# Patient Record
Sex: FEMALE | Race: WHITE | NOT HISPANIC OR LATINO | URBAN - METROPOLITAN AREA
[De-identification: names, ages, dates, MRNs, and addresses within clinical notes are randomized per-mention and may not be internally consistent; named-entity substitution may affect disease eponyms.]

---

## 2017-11-22 ENCOUNTER — IMPORTED ENCOUNTER (OUTPATIENT)
Dept: URBAN - METROPOLITAN AREA CLINIC 50 | Facility: CLINIC | Age: 82
End: 2017-11-22

## 2017-11-28 ENCOUNTER — IMPORTED ENCOUNTER (OUTPATIENT)
Dept: URBAN - METROPOLITAN AREA CLINIC 50 | Facility: CLINIC | Age: 82
End: 2017-11-28

## 2020-02-04 ENCOUNTER — IMPORTED ENCOUNTER (OUTPATIENT)
Dept: URBAN - METROPOLITAN AREA CLINIC 50 | Facility: CLINIC | Age: 85
End: 2020-02-04

## 2020-12-01 ENCOUNTER — IMPORTED ENCOUNTER (OUTPATIENT)
Dept: URBAN - METROPOLITAN AREA CLINIC 50 | Facility: CLINIC | Age: 85
End: 2020-12-01

## 2020-12-01 NOTE — PATIENT DISCUSSION
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 Mild diabetic retinopathy found. Macular edema is not present in the left eye. Patient instructed to call office immediately if sudden changes in vision occur. Emphasized importance of good blood glucose control. Return in 1 year for dilated fundus exam. Summary of care provided to the physician managing the ongoing diabetes care. """

## 2020-12-18 ENCOUNTER — IMPORTED ENCOUNTER (OUTPATIENT)
Dept: URBAN - METROPOLITAN AREA CLINIC 50 | Facility: CLINIC | Age: 85
End: 2020-12-18

## 2021-04-18 ASSESSMENT — VISUAL ACUITY
OS_CC: 20/50
OD_CC: 20/40-2
OD_CC: 20/50
OD_CC: J1
OS_CC: 20/50+1
OS_CC: J1@ 14 IN
OS_CC: J1@ 16 IN
OD_CC: 20/50
OD_OTHER: 20/80. 20/200.
OD_BAT: 20/80
OD_CC: J1@ 14 IN
OS_CC: 20/30
OS_CC: J1
OD_PH: 20/40-2
OD_CC: J1@ 16 IN
OS_OTHER: 20/80. 20/200.
OS_BAT: 20/80

## 2021-04-18 ASSESSMENT — TONOMETRY
OS_IOP_MMHG: 14
OD_IOP_MMHG: 15
OS_IOP_MMHG: 15
OD_IOP_MMHG: 14
OD_IOP_MMHG: 15
OS_IOP_MMHG: 15

## 2023-09-20 NOTE — PATIENT DISCUSSION
Retinal detachment warnings given. Ketoconazole Pregnancy And Lactation Text: This medication is Pregnancy Category C and it isn't know if it is safe during pregnancy. It is also excreted in breast milk and breast feeding isn't recommended.

## 2024-05-10 NOTE — PATIENT DISCUSSION
RIGHT KNEE ARTHROSCOPY WITH MPFL RECONSTRUCTION, TIBIAL TUBERCLE OSTEOTOMY AND CARTILAGE REPAIR    [phone]       Surgery date 6/5/24  Location:  [facility] ... Standard Medicaid Plan or [place] Medicaid?  NO - OK to proceed as scheduled at planned facility.    Laterality confirmed: Right   BMI - done   PCP(need to see within 30 days of surgery- usually between 2-3 weeks) - done - quinones   Cardiology- No  Insurance - Medicaid   Work Related-No     ASA(blood thinner) or NSAIDS(anti inflamatory) 14 days prior- informed   Weight loss meds - No   NPO after midnight - done   Has ride after discharge- done      Prehab- No  Rehab -No  DME - Yes    Pre Op - No  Post Op -   6/19/2024 2:15 PM Abner Arce,  [facility]     Appt Notes- DONE   Case request created- done   Message to OR - done   [facility]- [phone]    Service to Family/Internal Created(use tammie) -done   DME Order- done   Faxed to [facility]- done   Scheduled in [place] - done   Letter to Patient - Mail/Portal - done      Retinal detachment warnings given.